# Patient Record
Sex: MALE | Race: WHITE | NOT HISPANIC OR LATINO | Employment: OTHER | URBAN - METROPOLITAN AREA
[De-identification: names, ages, dates, MRNs, and addresses within clinical notes are randomized per-mention and may not be internally consistent; named-entity substitution may affect disease eponyms.]

---

## 2022-03-27 ENCOUNTER — APPOINTMENT (EMERGENCY)
Dept: CT IMAGING | Facility: HOSPITAL | Age: 77
End: 2022-03-27
Payer: MEDICARE

## 2022-03-27 ENCOUNTER — HOSPITAL ENCOUNTER (EMERGENCY)
Facility: HOSPITAL | Age: 77
Discharge: HOME | End: 2022-03-27
Attending: EMERGENCY MEDICINE | Admitting: EMERGENCY MEDICINE
Payer: MEDICARE

## 2022-03-27 VITALS
RESPIRATION RATE: 18 BRPM | TEMPERATURE: 97.8 F | DIASTOLIC BLOOD PRESSURE: 72 MMHG | SYSTOLIC BLOOD PRESSURE: 155 MMHG | OXYGEN SATURATION: 90 % | HEART RATE: 60 BPM

## 2022-03-27 DIAGNOSIS — N20.1 URETEROLITHIASIS: Primary | ICD-10-CM

## 2022-03-27 LAB
ALBUMIN SERPL BCP-MCNC: 3.7 G/DL (ref 3.5–5)
ALP SERPL-CCNC: 60 U/L (ref 46–116)
ALT SERPL W P-5'-P-CCNC: 40 U/L (ref 12–78)
ANION GAP SERPL CALCULATED.3IONS-SCNC: 10 MMOL/L (ref 4–13)
AST SERPL W P-5'-P-CCNC: 14 U/L (ref 5–45)
BACTERIA UR QL AUTO: NORMAL /HPF
BASOPHILS # BLD AUTO: 0.04 THOUSANDS/ΜL (ref 0–0.1)
BASOPHILS NFR BLD AUTO: 1 % (ref 0–1)
BILIRUB SERPL-MCNC: 0.26 MG/DL (ref 0.2–1)
BILIRUB UR QL STRIP: NEGATIVE
BUN SERPL-MCNC: 37 MG/DL (ref 5–25)
CALCIUM SERPL-MCNC: 9.1 MG/DL (ref 8.3–10.1)
CHLORIDE SERPL-SCNC: 106 MMOL/L (ref 100–108)
CLARITY UR: CLEAR
CO2 SERPL-SCNC: 22 MMOL/L (ref 21–32)
COLOR UR: YELLOW
CREAT SERPL-MCNC: 1.25 MG/DL (ref 0.6–1.3)
EOSINOPHIL # BLD AUTO: 0.16 THOUSAND/ΜL (ref 0–0.61)
EOSINOPHIL NFR BLD AUTO: 2 % (ref 0–6)
ERYTHROCYTE [DISTWIDTH] IN BLOOD BY AUTOMATED COUNT: 12.7 % (ref 11.6–15.1)
GFR SERPL CREATININE-BSD FRML MDRD: 54 ML/MIN/1.73SQ M
GLUCOSE SERPL-MCNC: 158 MG/DL (ref 65–140)
GLUCOSE UR STRIP-MCNC: NEGATIVE MG/DL
HCT VFR BLD AUTO: 39 % (ref 36.5–49.3)
HGB BLD-MCNC: 13.3 G/DL (ref 12–17)
HGB UR QL STRIP.AUTO: ABNORMAL
IMM GRANULOCYTES # BLD AUTO: 0.01 THOUSAND/UL (ref 0–0.2)
IMM GRANULOCYTES NFR BLD AUTO: 0 % (ref 0–2)
KETONES UR STRIP-MCNC: NEGATIVE MG/DL
LEUKOCYTE ESTERASE UR QL STRIP: NEGATIVE
LIPASE SERPL-CCNC: 444 U/L (ref 73–393)
LYMPHOCYTES # BLD AUTO: 2.14 THOUSANDS/ΜL (ref 0.6–4.47)
LYMPHOCYTES NFR BLD AUTO: 29 % (ref 14–44)
MCH RBC QN AUTO: 31.4 PG (ref 26.8–34.3)
MCHC RBC AUTO-ENTMCNC: 34.1 G/DL (ref 31.4–37.4)
MCV RBC AUTO: 92 FL (ref 82–98)
MONOCYTES # BLD AUTO: 0.75 THOUSAND/ΜL (ref 0.17–1.22)
MONOCYTES NFR BLD AUTO: 10 % (ref 4–12)
NEUTROPHILS # BLD AUTO: 4.34 THOUSANDS/ΜL (ref 1.85–7.62)
NEUTS SEG NFR BLD AUTO: 58 % (ref 43–75)
NITRITE UR QL STRIP: NEGATIVE
NON-SQ EPI CELLS URNS QL MICRO: NORMAL /HPF
NRBC BLD AUTO-RTO: 0 /100 WBCS
PH UR STRIP.AUTO: 5 [PH]
PLATELET # BLD AUTO: 203 THOUSANDS/UL (ref 149–390)
PMV BLD AUTO: 9 FL (ref 8.9–12.7)
POTASSIUM SERPL-SCNC: 4.2 MMOL/L (ref 3.5–5.3)
PROT SERPL-MCNC: 6.9 G/DL (ref 6.4–8.2)
PROT UR STRIP-MCNC: NEGATIVE MG/DL
RBC # BLD AUTO: 4.24 MILLION/UL (ref 3.88–5.62)
RBC #/AREA URNS AUTO: NORMAL /HPF
SODIUM SERPL-SCNC: 138 MMOL/L (ref 136–145)
SP GR UR STRIP.AUTO: >=1.03 (ref 1–1.03)
UROBILINOGEN UR QL STRIP.AUTO: 0.2 E.U./DL
WBC # BLD AUTO: 7.44 THOUSAND/UL (ref 4.31–10.16)
WBC #/AREA URNS AUTO: NORMAL /HPF

## 2022-03-27 PROCEDURE — 96376 TX/PRO/DX INJ SAME DRUG ADON: CPT

## 2022-03-27 PROCEDURE — 99285 EMERGENCY DEPT VISIT HI MDM: CPT | Performed by: PHYSICIAN ASSISTANT

## 2022-03-27 PROCEDURE — 96375 TX/PRO/DX INJ NEW DRUG ADDON: CPT

## 2022-03-27 PROCEDURE — 74177 CT ABD & PELVIS W/CONTRAST: CPT

## 2022-03-27 PROCEDURE — 83690 ASSAY OF LIPASE: CPT | Performed by: PHYSICIAN ASSISTANT

## 2022-03-27 PROCEDURE — 36415 COLL VENOUS BLD VENIPUNCTURE: CPT | Performed by: PHYSICIAN ASSISTANT

## 2022-03-27 PROCEDURE — 99284 EMERGENCY DEPT VISIT MOD MDM: CPT

## 2022-03-27 PROCEDURE — 96374 THER/PROPH/DIAG INJ IV PUSH: CPT

## 2022-03-27 PROCEDURE — 81001 URINALYSIS AUTO W/SCOPE: CPT | Performed by: PHYSICIAN ASSISTANT

## 2022-03-27 PROCEDURE — G1004 CDSM NDSC: HCPCS

## 2022-03-27 PROCEDURE — 80053 COMPREHEN METABOLIC PANEL: CPT | Performed by: PHYSICIAN ASSISTANT

## 2022-03-27 PROCEDURE — 85025 COMPLETE CBC W/AUTO DIFF WBC: CPT | Performed by: PHYSICIAN ASSISTANT

## 2022-03-27 RX ORDER — TAMSULOSIN HYDROCHLORIDE 0.4 MG/1
0.4 CAPSULE ORAL
Qty: 14 CAPSULE | Refills: 0 | Status: SHIPPED | OUTPATIENT
Start: 2022-03-27

## 2022-03-27 RX ORDER — IBUPROFEN 600 MG/1
600 TABLET ORAL ONCE
Status: COMPLETED | OUTPATIENT
Start: 2022-03-27 | End: 2022-03-27

## 2022-03-27 RX ORDER — HYDROMORPHONE HCL/PF 1 MG/ML
1 SYRINGE (ML) INJECTION ONCE
Status: COMPLETED | OUTPATIENT
Start: 2022-03-27 | End: 2022-03-27

## 2022-03-27 RX ORDER — OXYCODONE HYDROCHLORIDE 5 MG/1
5 TABLET ORAL EVERY 4 HOURS PRN
Qty: 10 TABLET | Refills: 0 | Status: SHIPPED | OUTPATIENT
Start: 2022-03-27

## 2022-03-27 RX ORDER — NAPROXEN 500 MG/1
500 TABLET ORAL 2 TIMES DAILY WITH MEALS
Qty: 30 TABLET | Refills: 0 | Status: SHIPPED | OUTPATIENT
Start: 2022-03-27

## 2022-03-27 RX ORDER — KETOROLAC TROMETHAMINE 30 MG/ML
15 INJECTION, SOLUTION INTRAMUSCULAR; INTRAVENOUS ONCE
Status: COMPLETED | OUTPATIENT
Start: 2022-03-27 | End: 2022-03-27

## 2022-03-27 RX ORDER — ONDANSETRON 2 MG/ML
4 INJECTION INTRAMUSCULAR; INTRAVENOUS ONCE
Status: COMPLETED | OUTPATIENT
Start: 2022-03-27 | End: 2022-03-27

## 2022-03-27 RX ORDER — ONDANSETRON 4 MG/1
4 TABLET, ORALLY DISINTEGRATING ORAL EVERY 6 HOURS PRN
Qty: 20 TABLET | Refills: 0 | Status: SHIPPED | OUTPATIENT
Start: 2022-03-27

## 2022-03-27 RX ADMIN — HYDROMORPHONE HYDROCHLORIDE 1 MG: 1 INJECTION, SOLUTION INTRAMUSCULAR; INTRAVENOUS; SUBCUTANEOUS at 01:35

## 2022-03-27 RX ADMIN — KETOROLAC TROMETHAMINE 15 MG: 30 INJECTION, SOLUTION INTRAMUSCULAR at 01:34

## 2022-03-27 RX ADMIN — HYDROMORPHONE HYDROCHLORIDE 1 MG: 1 INJECTION, SOLUTION INTRAMUSCULAR; INTRAVENOUS; SUBCUTANEOUS at 02:52

## 2022-03-27 RX ADMIN — ONDANSETRON 4 MG: 2 INJECTION INTRAMUSCULAR; INTRAVENOUS at 01:34

## 2022-03-27 RX ADMIN — IOHEXOL 100 ML: 350 INJECTION, SOLUTION INTRAVENOUS at 02:34

## 2022-03-27 RX ADMIN — IBUPROFEN 600 MG: 600 TABLET ORAL at 04:09

## 2022-03-27 NOTE — ED PROVIDER NOTES
History  Chief Complaint   Patient presents with    Flank Pain     pt arrived ambulatory with c/o left side flank pain  pt states it feels like it does when he has a kidney stone     Patient is a 40-year-old male with a past medical history significant for diabetes, previous kidney stones presenting to the emergency department for evaluation of sudden-onset left-sided flank pain radiating to his left groin that started about 2-1/2 hours ago  Patient states that this feels similar to previous kidney stones that he has had in the past   He is reporting associated nausea without vomiting  He is not having any fevers, chills, chest pain, difficulty breathing, hematuria, dysuria  No other complaints at this time          None       Past Medical History:   Diagnosis Date    Diabetes mellitus ([facility] )     Kidney stones     Prostate cancer ([facility] )        History reviewed  No pertinent surgical history  History reviewed  No pertinent family history  I have reviewed and agree with the history as documented  E-Cigarette/Vaping     E-Cigarette/Vaping Substances     Social History     Tobacco Use    Smoking status: Former Smoker    Smokeless tobacco: Never Used   Substance Use Topics    Alcohol use: Never    Drug use: Never       Review of Systems   Constitutional: Negative for chills, diaphoresis and fever  HENT: Negative for congestion, drooling, facial swelling, nosebleeds, sore throat and voice change  Eyes: Negative for discharge and redness  Respiratory: Negative for cough, choking, chest tightness, shortness of breath and stridor  Cardiovascular: Negative for chest pain and palpitations  Gastrointestinal: Positive for abdominal pain and nausea  Negative for diarrhea and vomiting  Genitourinary: Positive for flank pain  Negative for decreased urine volume, difficulty urinating, dysuria, frequency and urgency  Musculoskeletal: Negative for arthralgias, back pain, neck pain and neck stiffness  Skin: Negative for color change, rash and wound  Neurological: Negative for dizziness, syncope, facial asymmetry, weakness, light-headedness, numbness and headaches  Psychiatric/Behavioral: Negative for confusion and suicidal ideas  The patient is not nervous/anxious  All other systems reviewed and are negative  Physical Exam  Physical Exam  Vitals reviewed  Constitutional:       General: He is not in acute distress  Appearance: Normal appearance  He is obese  He is not ill-appearing, toxic-appearing or diaphoretic  HENT:      Head: Normocephalic and atraumatic  Right Ear: External ear normal       Left Ear: External ear normal       Mouth/Throat:      Mouth: Mucous membranes are moist       Pharynx: Oropharynx is clear  No oropharyngeal exudate or posterior oropharyngeal erythema  Eyes:      General: No scleral icterus  Right eye: No discharge  Left eye: No discharge  Extraocular Movements: Extraocular movements intact  Conjunctiva/sclera: Conjunctivae normal       Pupils: Pupils are equal, round, and reactive to light  Cardiovascular:      Rate and Rhythm: Normal rate and regular rhythm  Pulses: Normal pulses  Heart sounds: Normal heart sounds  No murmur heard  No friction rub  No gallop  Pulmonary:      Effort: Pulmonary effort is normal  No respiratory distress  Breath sounds: Normal breath sounds  No stridor  No wheezing, rhonchi or rales  Abdominal:      General: Abdomen is flat  Palpations: Abdomen is soft  Tenderness: There is abdominal tenderness  There is left CVA tenderness  There is no right CVA tenderness, guarding or rebound  Musculoskeletal:         General: Normal range of motion  Cervical back: Normal range of motion and neck supple  Right lower leg: No edema  Left lower leg: No edema  Skin:     General: Skin is warm and dry  Capillary Refill: Capillary refill takes less than 2 seconds  Neurological:      General: No focal deficit present  Mental Status: He is alert and oriented to person, place, and time     Psychiatric:         Mood and Affect: Mood normal          Behavior: Behavior normal          Vital Signs  ED Triage Vitals [03/27/22 0113]   Temperature Pulse Respirations Blood Pressure SpO2   97 8 °F (36 6 °C) 94 20 (!) 171/77 96 %      Temp Source Heart Rate Source Patient Position - Orthostatic VS BP Location FiO2 (%)   Oral Monitor Sitting Right arm --      Pain Score       --           Vitals:    03/27/22 0113 03/27/22 0115 03/27/22 0200   BP: (!) 171/77 (!) 171/77 119/56   Pulse: 94 93 76   Patient Position - Orthostatic VS: Sitting           Visual Acuity      ED Medications  Medications   ibuprofen (MOTRIN) tablet 600 mg (has no administration in time range)   ketorolac (TORADOL) injection 15 mg (15 mg Intravenous Given 3/27/22 0134)   ondansetron (ZOFRAN) injection 4 mg (4 mg Intravenous Given 3/27/22 0134)   HYDROmorphone (DILAUDID) injection 1 mg (1 mg Intravenous Given 3/27/22 0135)   iohexol (OMNIPAQUE) 350 MG/ML injection (SINGLE-DOSE) 100 mL (100 mL Intravenous Given 3/27/22 0234)   HYDROmorphone (DILAUDID) injection 1 mg (1 mg Intravenous Given 3/27/22 0252)       Diagnostic Studies  Results Reviewed     Procedure Component Value Units Date/Time    Urine Microscopic [[id number]]  (Normal) Collected: 03/27/22 0254    Lab Status: Final result Specimen: Urine, Clean Catch Updated: 03/27/22 0313     RBC, UA 2-4 /hpf      WBC, UA None Seen /hpf      Epithelial Cells None Seen /hpf      Bacteria, UA None Seen /hpf     UA w Reflex to Microscopic w Reflex to Culture [[id number]]  (Abnormal) Collected: 03/27/22 0254    Lab Status: Final result Specimen: Urine, Clean Catch Updated: 03/27/22 0300     Color, UA Yellow     Clarity, UA Clear     Specific Gravity, UA >=1 030     pH, UA 5 0     Leukocytes, UA Negative     Nitrite, UA Negative     Protein, UA Negative mg/dl      Glucose, UA Negative mg/dl      Ketones, UA Negative mg/dl      Urobilinogen, UA 0 2 E U /dl      Bilirubin, UA Negative     Blood, UA Large    Lipase [[id number]]  (Abnormal) Collected: 03/27/22 0137    Lab Status: Final result Specimen: Blood from Arm, Right Updated: 03/27/22 0204     Lipase 444 u/L     Comprehensive metabolic panel [[id number]]  (Abnormal) Collected: 03/27/22 0137    Lab Status: Final result Specimen: Blood from Arm, Right Updated: 03/27/22 0119     Sodium 138 mmol/L      Potassium 4 2 mmol/L      Chloride 106 mmol/L      CO2 22 mmol/L      ANION GAP 10 mmol/L      BUN 37 mg/dL      Creatinine 1 25 mg/dL      Glucose 158 mg/dL      Calcium 9 1 mg/dL      AST 14 U/L      ALT 40 U/L      Alkaline Phosphatase 60 U/L      Total Protein 6 9 g/dL      Albumin 3 7 g/dL      Total Bilirubin 0 26 mg/dL      eGFR 54 ml/min/1 73sq m     Narrative:      [place] guidelines for Chronic Kidney Disease (CKD):     Stage 1 with normal or high GFR (GFR > 90 mL/min/1 73 square meters)    Stage 2 Mild CKD (GFR = 60-89 mL/min/1 73 square meters)    Stage 3A Moderate CKD (GFR = 45-59 mL/min/1 73 square meters)    Stage 3B Moderate CKD (GFR = 30-44 mL/min/1 73 square meters)    Stage 4 Severe CKD (GFR = 15-29 mL/min/1 73 square meters)    Stage 5 End Stage CKD (GFR <15 mL/min/1 73 square meters)  Note: GFR calculation is accurate only with a steady state creatinine    CBC and differential [[id number]] Collected: 03/27/22 0137    Lab Status: Final result Specimen: Blood from Arm, Right Updated: 03/27/22 0143     WBC 7 44 Thousand/uL      RBC 4 24 Million/uL      Hemoglobin 13 3 g/dL      Hematocrit 39 0 %      MCV 92 fL      MCH 31 4 pg      MCHC 34 1 g/dL      RDW 12 7 %      MPV 9 0 fL      Platelets 673 Thousands/uL      nRBC 0 /100 WBCs      Neutrophils Relative 58 %      Immat GRANS % 0 %      Lymphocytes Relative 29 %      Monocytes Relative 10 %      Eosinophils Relative 2 %      Basophils Relative 1 %      Neutrophils Absolute 4 34 Thousands/µL      Immature Grans Absolute 0 01 Thousand/uL      Lymphocytes Absolute 2 14 Thousands/µL      Monocytes Absolute 0 75 Thousand/µL      Eosinophils Absolute 0 16 Thousand/µL      Basophils Absolute 0 04 Thousands/µL                  CT abdomen pelvis with contrast   Final Result by Mauricio Leventhal, MD (03/27 0350)      3 mm obstructing stone in the left UVJ resulting left hydroureteronephrosis  Workstation performed: [id number]                    Procedures  Procedures         ED Course                               SBIRT 22yo+      Most Recent Value   SBIRT (22 yo +)    In order to provide better care to our patients, we are screening all of our patients for alcohol and drug use  Would it be okay to ask you these screening questions? Yes Filed at: 03/27/2022 0140   Initial Alcohol Screen: US AUDIT-C     1  How often do you have a drink containing alcohol? 0 Filed at: 03/27/2022 0140   2  How many drinks containing alcohol do you have on a typical day you are drinking? 0 Filed at: 03/27/2022 0140   3b  FEMALE Any Age, or MALE 65+: How often do you have 4 or more drinks on one occassion? 0 Filed at: 03/27/2022 0140   Audit-C Score 0 Filed at: 03/27/2022 0140   HOWARD: How many times in the past year have you    Used an illegal drug or used a prescription medication for non-medical reasons? Never Filed at: 03/27/2022 0140                    MDM  Number of Diagnoses or Management Options  Ureterolithiasis  Diagnosis management comments: Patient presenting for evaluation of sudden-onset left-sided christian playing radiating to the left groin  He appears uncomfortable secondary to pain  Vital signs unremarkable  He has left CVA tenderness and left lower quadrant abdominal tenderness on exam   Lab work unremarkable  CT of the abdomen and pelvis revealed a 3 mm stone in left UVJ    Patient's pain was adequately controlled with Dilaudid the emergency department  He was given a prescription for Zofran, naproxen, oxycodone, tamsulosin to take at home  He was given instructions to follow-up with his primary care provider  Strict return precautions were discussed  Patient discharged home in stable condition       Amount and/or Complexity of Data Reviewed  Clinical lab tests: ordered and reviewed  Tests in the radiology section of CPT®: reviewed and ordered    Patient Progress  Patient progress: stable      Disposition  Final diagnoses:   Ureterolithiasis     Time reflects when diagnosis was documented in both MDM as applicable and the Disposition within this note     Time User Action Codes Description Comment    3/27/2022  4:03 AM Jose Ramirez Add [N20 1] Ureterolithiasis       ED Disposition     ED Disposition Condition Date/Time Comment    Discharge Stable Sun Mar 27, 2022  4:03 AM Te Loya discharge to home/self care              Follow-up Information     Follow up With Specialties Details Why Contact Info Additional [address] [facility] Emergency Department Emergency Medicine Go to  If symptoms worsen [address] Emergency Department, [address] [place] [address], Chelsie Rhoades PA-C Physician Assistant Schedule an appointment as soon as possible for a visit  for follow up [address]  [phone]             Patient's Medications   Discharge Prescriptions    NAPROXEN (NAPROSYN) 500 MG TABLET    Take 1 tablet (500 mg total) by mouth 2 (two) times a day with meals       Start Date: 3/27/2022 End Date: --       Order Dose: 500 mg       Quantity: 30 tablet    Refills: 0    ONDANSETRON (ZOFRAN ODT) 4 MG DISINTEGRATING TABLET    Take 1 tablet (4 mg total) by mouth every 6 (six) hours as needed for nausea or vomiting       Start Date: 3/27/2022 End Date: --       Order Dose: 4 mg Quantity: 20 tablet    Refills: 0    OXYCODONE (ROXICODONE) 5 IMMEDIATE RELEASE TABLET    Take 1 tablet (5 mg total) by mouth every 4 (four) hours as needed for moderate pain for up to 10 doses Max Daily Amount: 30 mg       Start Date: 3/27/2022 End Date: --       Order Dose: 5 mg       Quantity: 10 tablet    Refills: 0    TAMSULOSIN (FLOMAX) 0 4 MG    Take 1 capsule (0 4 mg total) by mouth daily with dinner       Start Date: 3/27/2022 End Date: --       Order Dose: 0 4 mg       Quantity: 14 capsule    Refills: 0       No discharge procedures on file      PDMP Review     None          ED Provider  Electronically Signed by           Jacinta Meade PA-C  03/27/22 2205

## 2023-08-11 ENCOUNTER — OFFICE (OUTPATIENT)
Dept: URBAN - METROPOLITAN AREA CLINIC 38 | Facility: CLINIC | Age: 78
Setting detail: OPHTHALMOLOGY
End: 2023-08-11
Payer: MEDICARE

## 2023-08-11 ENCOUNTER — RX ONLY (RX ONLY)
Age: 78
End: 2023-08-11

## 2023-08-11 DIAGNOSIS — H01.004: ICD-10-CM

## 2023-08-11 DIAGNOSIS — E11.9: ICD-10-CM

## 2023-08-11 DIAGNOSIS — H01.001: ICD-10-CM

## 2023-08-11 DIAGNOSIS — Z96.1: ICD-10-CM

## 2023-08-11 DIAGNOSIS — H40.1131: ICD-10-CM

## 2023-08-11 DIAGNOSIS — H11.153: ICD-10-CM

## 2023-08-11 DIAGNOSIS — H52.4: ICD-10-CM

## 2023-08-11 DIAGNOSIS — H43.23: ICD-10-CM

## 2023-08-11 PROCEDURE — 92015 DETERMINE REFRACTIVE STATE: CPT | Performed by: OPHTHALMOLOGY

## 2023-08-11 PROCEDURE — 92004 COMPRE OPH EXAM NEW PT 1/>: CPT | Performed by: OPHTHALMOLOGY

## 2023-08-11 ASSESSMENT — CONFRONTATIONAL VISUAL FIELD TEST (CVF)
OS_FINDINGS: FULL
OD_FINDINGS: FULL

## 2023-08-11 ASSESSMENT — REFRACTION_CURRENTRX
OS_OVR_VA: 20/
OD_CYLINDER: -1.75
OS_VPRISM_DIRECTION: SV
OD_AXIS: 073
OD_VPRISM_DIRECTION: SV
OD_OVR_VA: 20/
OS_SPHERE: +1.00
OS_AXIS: 086
OD_SPHERE: +1.75
OS_CYLINDER: -1.50

## 2023-08-11 ASSESSMENT — KERATOMETRY
OD_K1POWER_DIOPTERS: 42.00
OS_K1POWER_DIOPTERS: 42.50
OS_AXISANGLE_DEGREES: 163
OD_K2POWER_DIOPTERS: 43.25
OS_K2POWER_DIOPTERS: 43.50
OD_AXISANGLE_DEGREES: 175

## 2023-08-11 ASSESSMENT — SPHEQUIV_DERIVED
OS_SPHEQUIV: 0
OD_SPHEQUIV: -0.125
OS_SPHEQUIV: 0.25
OD_SPHEQUIV: 0.375
OS_SPHEQUIV: 0
OD_SPHEQUIV: -0.125

## 2023-08-11 ASSESSMENT — REFRACTION_AUTOREFRACTION
OD_SPHERE: +1.25
OD_AXIS: 092
OS_SPHERE: +1.25
OD_CYLINDER: -1.75
OS_AXIS: 075
OS_CYLINDER: -2.00

## 2023-08-11 ASSESSMENT — REFRACTION_MANIFEST
OD_ADD: +2.50
OU_VA: 20/20-1
OS_AXIS: 075
OS_SPHERE: +0.75
OS_VA2: 20/25
OD_SPHERE: +0.75
OS_ADD: +2.50
OS_AXIS: 075
OD_VA1: 20/20-1
OS_ADD: +2.50
OS_CYLINDER: -1.50
OS_VA1: 20/20-2
OS_CYLINDER: -1.50
OD_AXIS: 090
OS_SPHERE: +0.75
OD_VA2: 20/25
OD_SPHERE: +0.75
OU_VA: 20/20-1
OD_CYLINDER: -1.75
OS_VA2: 20/25
OD_VA1: 20/20-1
OD_AXIS: 090
OD_ADD: +2.50
OD_VA2: 20/25
OS_VA1: 20/20-2
OD_CYLINDER: -1.75

## 2023-08-11 ASSESSMENT — VISUAL ACUITY
OD_BCVA: 20/25+
OS_BCVA: 20/25-2

## 2023-08-11 ASSESSMENT — LID EXAM ASSESSMENTS
OS_BLEPHARITIS: LUL 3+ 4+
OD_BLEPHARITIS: RUL 3+ 4+

## 2023-08-11 ASSESSMENT — AXIALLENGTH_DERIVED
OD_AL: 23.9679
OD_AL: 23.9679
OS_AL: 23.777
OD_AL: 23.7685
OS_AL: 23.6785
OS_AL: 23.777

## 2023-08-11 ASSESSMENT — TONOMETRY
OD_IOP_MMHG: 19
OS_IOP_MMHG: 20

## 2023-08-22 ENCOUNTER — APPOINTMENT (OUTPATIENT)
Dept: CT IMAGING | Facility: CLINIC | Age: 78
End: 2023-08-22
Payer: MEDICARE

## 2023-08-22 PROCEDURE — 71250 CT THORAX DX C-: CPT | Mod: MH

## 2023-09-08 PROBLEM — Z00.00 ENCOUNTER FOR PREVENTIVE HEALTH EXAMINATION: Status: ACTIVE | Noted: 2023-09-08

## 2023-09-26 ENCOUNTER — APPOINTMENT (OUTPATIENT)
Dept: ULTRASOUND IMAGING | Facility: CLINIC | Age: 78
End: 2023-09-26

## 2023-12-30 LAB — HBA1C MFR BLD HPLC: 7

## 2024-01-03 ENCOUNTER — NON-APPOINTMENT (OUTPATIENT)
Age: 79
End: 2024-01-03

## 2024-01-03 ENCOUNTER — APPOINTMENT (OUTPATIENT)
Dept: CARDIOLOGY | Facility: CLINIC | Age: 79
End: 2024-01-03
Payer: MEDICARE

## 2024-01-03 VITALS
WEIGHT: 181 LBS | SYSTOLIC BLOOD PRESSURE: 104 MMHG | HEIGHT: 65 IN | BODY MASS INDEX: 30.16 KG/M2 | HEART RATE: 73 BPM | DIASTOLIC BLOOD PRESSURE: 68 MMHG | OXYGEN SATURATION: 97 %

## 2024-01-03 DIAGNOSIS — Z13.6 ENCOUNTER FOR SCREENING FOR CARDIOVASCULAR DISORDERS: ICD-10-CM

## 2024-01-03 DIAGNOSIS — E11.9 TYPE 2 DIABETES MELLITUS W/OUT COMPLICATIONS: ICD-10-CM

## 2024-01-03 DIAGNOSIS — N40.0 BENIGN PROSTATIC HYPERPLASIA WITHOUT LOWER URINARY TRACT SYMPMS: ICD-10-CM

## 2024-01-03 DIAGNOSIS — Z80.0 FAMILY HISTORY OF MALIGNANT NEOPLASM OF DIGESTIVE ORGANS: ICD-10-CM

## 2024-01-03 DIAGNOSIS — Z85.46 PERSONAL HISTORY OF MALIGNANT NEOPLASM OF PROSTATE: ICD-10-CM

## 2024-01-03 DIAGNOSIS — I10 ESSENTIAL (PRIMARY) HYPERTENSION: ICD-10-CM

## 2024-01-03 DIAGNOSIS — Z78.9 OTHER SPECIFIED HEALTH STATUS: ICD-10-CM

## 2024-01-03 DIAGNOSIS — E78.5 HYPERLIPIDEMIA, UNSPECIFIED: ICD-10-CM

## 2024-01-03 DIAGNOSIS — K29.50 UNSPECIFIED CHRONIC GASTRITIS W/OUT BLEEDING: ICD-10-CM

## 2024-01-03 DIAGNOSIS — Z87.891 PERSONAL HISTORY OF NICOTINE DEPENDENCE: ICD-10-CM

## 2024-01-03 PROCEDURE — 93000 ELECTROCARDIOGRAM COMPLETE: CPT

## 2024-01-03 PROCEDURE — 99204 OFFICE O/P NEW MOD 45 MIN: CPT

## 2024-01-03 RX ORDER — MONTELUKAST 10 MG/1
10 TABLET, FILM COATED ORAL
Refills: 0 | Status: ACTIVE | COMMUNITY

## 2024-01-03 RX ORDER — TAMSULOSIN HYDROCHLORIDE 0.4 MG/1
0.4 CAPSULE ORAL
Refills: 0 | Status: ACTIVE | COMMUNITY

## 2024-01-03 RX ORDER — GLIMEPIRIDE 2 MG/1
2 TABLET ORAL
Refills: 0 | Status: ACTIVE | COMMUNITY

## 2024-01-03 RX ORDER — METFORMIN HYDROCHLORIDE 500 MG/1
500 TABLET, FILM COATED, EXTENDED RELEASE ORAL
Refills: 0 | Status: ACTIVE | COMMUNITY

## 2024-01-03 RX ORDER — AMLODIPINE BESYLATE 5 MG/1
5 TABLET ORAL
Refills: 0 | Status: ACTIVE | COMMUNITY

## 2024-01-03 RX ORDER — LOSARTAN POTASSIUM 25 MG/1
25 TABLET, FILM COATED ORAL
Refills: 0 | Status: DISCONTINUED | COMMUNITY
End: 2024-01-03

## 2024-01-03 RX ORDER — SIMVASTATIN 20 MG/1
20 TABLET, FILM COATED ORAL
Refills: 0 | Status: ACTIVE | COMMUNITY

## 2024-01-03 RX ORDER — PIOGLITAZONE HYDROCHLORIDE 30 MG/1
30 TABLET ORAL
Refills: 0 | Status: ACTIVE | COMMUNITY

## 2024-01-03 NOTE — PHYSICAL EXAM
[Normal] : moves all extremities, no focal deficits, normal speech [de-identified] :  No carotid bruits auscultated bilaterally.

## 2024-01-03 NOTE — HISTORY OF PRESENT ILLNESS
[FreeTextEntry1] : 78 year old male, former smoker, PMHx of HTN, HLD, DM, prostate cancer treated with cyberknife presents for a cardiac evaluation. Patient was hospitalized in June 2023 with bronchitis and since that time he has had a chronic cough. He states he saw multiple specialists for this cough and nobody seems to know what is causing it. He has no chest pain, dyspnea or palpitations.  There is no history of MI, CVA, CHF, or previous coronary intervention.

## 2024-01-03 NOTE — REVIEW OF SYSTEMS
[Cough] : cough [Joint Pain] : joint pain [Joint Stiffness] : joint stiffness [Negative] : Neurological [FreeTextEntry5] : see HPI

## 2024-01-03 NOTE — DISCUSSION/SUMMARY
[FreeTextEntry1] : 1. HTN: Goal BP less than 130/80. Recommend low salt diet. Continue amlodipine 5mg daily.  2. HLD: continue simvastatin 20mg daily.  Given chronic cough will order echocardiogram and proBNP level.   Given smoking history will order abdominal aortic duplex to screen for AAA.  Office will call with results.  Follow up in 6 months. [EKG obtained to assist in diagnosis and management of assessed problem(s)] : EKG obtained to assist in diagnosis and management of assessed problem(s)

## 2024-02-02 ENCOUNTER — APPOINTMENT (OUTPATIENT)
Dept: CARDIOLOGY | Facility: CLINIC | Age: 79
End: 2024-02-02
Payer: MEDICARE

## 2024-02-02 PROCEDURE — 93306 TTE W/DOPPLER COMPLETE: CPT

## 2024-02-02 PROCEDURE — 93979 VASCULAR STUDY: CPT

## 2024-02-06 DIAGNOSIS — I77.810 THORACIC AORTIC ECTASIA: ICD-10-CM

## 2024-06-28 ENCOUNTER — OFFICE (OUTPATIENT)
Dept: URBAN - METROPOLITAN AREA CLINIC 38 | Facility: CLINIC | Age: 79
Setting detail: OPHTHALMOLOGY
End: 2024-06-28
Payer: MEDICARE

## 2024-06-28 DIAGNOSIS — H01.001: ICD-10-CM

## 2024-06-28 DIAGNOSIS — H43.23: ICD-10-CM

## 2024-06-28 DIAGNOSIS — H40.1131: ICD-10-CM

## 2024-06-28 DIAGNOSIS — H01.004: ICD-10-CM

## 2024-06-28 PROCEDURE — 92133 CPTRZD OPH DX IMG PST SGM ON: CPT

## 2024-06-28 PROCEDURE — 92014 COMPRE OPH EXAM EST PT 1/>: CPT

## 2024-06-28 PROCEDURE — 92083 EXTENDED VISUAL FIELD XM: CPT

## 2024-06-28 ASSESSMENT — LID EXAM ASSESSMENTS
OD_BLEPHARITIS: RUL 2+
OS_BLEPHARITIS: LUL 2+

## 2024-06-28 ASSESSMENT — CONFRONTATIONAL VISUAL FIELD TEST (CVF)
OD_FINDINGS: FULL
OS_FINDINGS: FULL

## 2024-07-02 ENCOUNTER — APPOINTMENT (OUTPATIENT)
Dept: CARDIOLOGY | Facility: CLINIC | Age: 79
End: 2024-07-02

## 2024-12-20 ENCOUNTER — OFFICE (OUTPATIENT)
Dept: URBAN - METROPOLITAN AREA CLINIC 38 | Facility: CLINIC | Age: 79
Setting detail: OPHTHALMOLOGY
End: 2024-12-20
Payer: MEDICARE

## 2024-12-20 DIAGNOSIS — H11.153: ICD-10-CM

## 2024-12-20 DIAGNOSIS — H01.004: ICD-10-CM

## 2024-12-20 DIAGNOSIS — H01.001: ICD-10-CM

## 2024-12-20 DIAGNOSIS — H40.1131: ICD-10-CM

## 2024-12-20 PROCEDURE — 99213 OFFICE O/P EST LOW 20 MIN: CPT

## 2024-12-20 ASSESSMENT — REFRACTION_MANIFEST
OD_AXIS: 090
OS_CYLINDER: -2.00
OD_CYLINDER: -1.75
OS_AXIS: 075
OD_ADD: +2.50
OS_VA2: 20/25
OS_ADD: +2.50
OD_VA2: 20/25
OD_CYLINDER: -1.50
OS_CYLINDER: -1.50
OS_VA2: 20/25(J1)
OD_VA2: 20/25(J1)
OS_VA1: 20/20-2
OS_SPHERE: +1.25
OS_SPHERE: +0.75
OD_SPHERE: +0.75
OD_AXIS: 085
OD_ADD: +2.75
OU_VA: 20/20-1
OD_VA1: 20/20-1
OS_VA1: 20/20-2
OS_ADD: +2.75
OS_AXIS: 075
OD_SPHERE: +0.75
OD_VA1: 20/20-1
OU_VA: 20/20

## 2024-12-20 ASSESSMENT — KERATOMETRY
OD_K2POWER_DIOPTERS: 42.25
METHOD_AUTO_MANUAL: AUTO
OD_AXISANGLE_DEGREES: 175
OS_AXISANGLE_DEGREES: 170
OS_K2POWER_DIOPTERS: 43.50
OD_K1POWER_DIOPTERS: 42.25
OS_K1POWER_DIOPTERS: 42.25

## 2024-12-20 ASSESSMENT — REFRACTION_CURRENTRX
OS_OVR_VA: 20/
OD_OVR_VA: 20/
OS_CYLINDER: -1.50
OS_VPRISM_DIRECTION: SV
OD_SPHERE: +1.75
OS_SPHERE: +1.00
OD_VPRISM_DIRECTION: SV
OD_CYLINDER: -1.75
OS_AXIS: 086
OD_AXIS: 073

## 2024-12-20 ASSESSMENT — TONOMETRY
OD_IOP_MMHG: 15
OS_IOP_MMHG: 15

## 2024-12-20 ASSESSMENT — REFRACTION_AUTOREFRACTION
OS_CYLINDER: -2.00
OS_AXIS: 075
OD_CYLINDER: -1.50
OD_AXIS: 085
OD_SPHERE: +0.75
OS_SPHERE: +1.25

## 2024-12-20 ASSESSMENT — LID EXAM ASSESSMENTS
OS_BLEPHARITIS: LUL 2+
OD_BLEPHARITIS: RUL 2+

## 2024-12-20 ASSESSMENT — VISUAL ACUITY
OD_BCVA: 20/40
OS_BCVA: 20/40

## 2024-12-20 ASSESSMENT — CONFRONTATIONAL VISUAL FIELD TEST (CVF)
OS_FINDINGS: FULL
OD_FINDINGS: FULL

## 2025-01-31 ENCOUNTER — APPOINTMENT (OUTPATIENT)
Dept: ULTRASOUND IMAGING | Facility: CLINIC | Age: 80
End: 2025-01-31

## 2025-01-31 PROCEDURE — 76536 US EXAM OF HEAD AND NECK: CPT

## 2025-06-02 ENCOUNTER — APPOINTMENT (OUTPATIENT)
Dept: MRI IMAGING | Facility: CLINIC | Age: 80
End: 2025-06-02
Payer: MEDICARE

## 2025-06-02 PROCEDURE — 73718 MRI LOWER EXTREMITY W/O DYE: CPT | Mod: LT

## 2025-06-02 PROCEDURE — 73721 MRI JNT OF LWR EXTRE W/O DYE: CPT | Mod: LT

## 2025-06-30 ENCOUNTER — OFFICE (OUTPATIENT)
Dept: URBAN - METROPOLITAN AREA CLINIC 38 | Facility: CLINIC | Age: 80
Setting detail: OPHTHALMOLOGY
End: 2025-06-30
Payer: MEDICARE

## 2025-06-30 DIAGNOSIS — H43.23: ICD-10-CM

## 2025-06-30 DIAGNOSIS — E11.9: ICD-10-CM

## 2025-06-30 DIAGNOSIS — H52.4: ICD-10-CM

## 2025-06-30 DIAGNOSIS — H40.1131: ICD-10-CM

## 2025-06-30 DIAGNOSIS — H01.004: ICD-10-CM

## 2025-06-30 DIAGNOSIS — Z96.1: ICD-10-CM

## 2025-06-30 DIAGNOSIS — H01.001: ICD-10-CM

## 2025-06-30 DIAGNOSIS — H11.153: ICD-10-CM

## 2025-06-30 PROCEDURE — 92133 CPTRZD OPH DX IMG PST SGM ON: CPT

## 2025-06-30 PROCEDURE — 92015 DETERMINE REFRACTIVE STATE: CPT

## 2025-06-30 PROCEDURE — 76514 ECHO EXAM OF EYE THICKNESS: CPT

## 2025-06-30 PROCEDURE — 92014 COMPRE OPH EXAM EST PT 1/>: CPT

## 2025-06-30 ASSESSMENT — REFRACTION_CURRENTRX
OD_AXIS: 069
OD_SPHERE: +1.75
OD_VPRISM_DIRECTION: SV
OS_CYLINDER: -1.50
OD_OVR_VA: 20/
OS_AXIS: 084
OS_SPHERE: +1.00
OS_OVR_VA: 20/
OD_CYLINDER: -1.50
OS_VPRISM_DIRECTION: SV

## 2025-06-30 ASSESSMENT — REFRACTION_MANIFEST
OS_AXIS: 80
OD_SPHERE: +0.50
OD_AXIS: 090
OS_VA1: 20/20-1
OS_SPHERE: +0.50
OD_VA2: 20/20(J1+)
OS_CYLINDER: -1.50
OD_ADD: +2.75
OD_VA1: 20/20-2
OD_ADD: +3.00
OU_VA: 20/20-1
OD_SPHERE: +1.00
OS_SPHERE: +0.75
OS_ADD: +2.75
OD_VA1: 20/20-1
OD_AXIS: 090
OD_CYLINDER: -2.50
OS_ADD: +3.00
OS_CYLINDER: -1.50
OU_VA: 20/20-1
OS_VA2: 20/25
OD_CYLINDER: -1.75
OS_VA1: 20/20-2
OD_VA2: 20/25
OS_VA2: 20/20(J1+)
OS_AXIS: 080

## 2025-06-30 ASSESSMENT — VISUAL ACUITY
OS_BCVA: 20/25-1
OD_BCVA: 20/25-1

## 2025-06-30 ASSESSMENT — CONFRONTATIONAL VISUAL FIELD TEST (CVF)
OD_FINDINGS: FULL
OS_FINDINGS: FULL